# Patient Record
Sex: FEMALE | Race: WHITE | RURAL
[De-identification: names, ages, dates, MRNs, and addresses within clinical notes are randomized per-mention and may not be internally consistent; named-entity substitution may affect disease eponyms.]

---

## 2022-08-16 ENCOUNTER — OFFICE VISIT (OUTPATIENT)
Dept: INTERNAL MEDICINE CLINIC | Age: 32
End: 2022-08-16

## 2022-08-16 VITALS
BODY MASS INDEX: 33.97 KG/M2 | RESPIRATION RATE: 20 BRPM | HEART RATE: 83 BPM | OXYGEN SATURATION: 97 % | WEIGHT: 199 LBS | HEIGHT: 64 IN | TEMPERATURE: 98.4 F | DIASTOLIC BLOOD PRESSURE: 84 MMHG | SYSTOLIC BLOOD PRESSURE: 130 MMHG

## 2022-08-16 DIAGNOSIS — K21.00 GASTROESOPHAGEAL REFLUX DISEASE WITH ESOPHAGITIS WITHOUT HEMORRHAGE: ICD-10-CM

## 2022-08-16 DIAGNOSIS — Z76.89 ENCOUNTER TO ESTABLISH CARE WITH NEW DOCTOR: Primary | ICD-10-CM

## 2022-08-16 DIAGNOSIS — Z11.59 NEED FOR HEPATITIS C SCREENING TEST: ICD-10-CM

## 2022-08-16 PROBLEM — F43.10 PTSD (POST-TRAUMATIC STRESS DISORDER): Status: ACTIVE | Noted: 2022-08-16

## 2022-08-16 PROBLEM — F10.10 ETOH ABUSE: Status: ACTIVE | Noted: 2022-08-16

## 2022-08-16 PROBLEM — F50.2 BULIMIA NERVOSA IN REMISSION: Status: ACTIVE | Noted: 2022-08-16

## 2022-08-16 PROBLEM — F90.9 ADHD: Status: ACTIVE | Noted: 2022-08-16

## 2022-08-16 PROBLEM — F17.200 SMOKER: Status: ACTIVE | Noted: 2022-08-16

## 2022-08-16 PROBLEM — G47.09 OTHER INSOMNIA: Status: ACTIVE | Noted: 2022-08-16

## 2022-08-16 PROBLEM — F31.9 BIPOLAR AFFECTIVE (HCC): Status: ACTIVE | Noted: 2022-08-16

## 2022-08-16 PROBLEM — F12.90 MARIJUANA USE: Status: ACTIVE | Noted: 2022-08-16

## 2022-08-16 PROBLEM — F41.1 GAD (GENERALIZED ANXIETY DISORDER): Status: ACTIVE | Noted: 2022-08-16

## 2022-08-16 PROCEDURE — 99204 OFFICE O/P NEW MOD 45 MIN: CPT | Performed by: NURSE PRACTITIONER

## 2022-08-16 RX ORDER — FAMOTIDINE 20 MG/1
20 TABLET, FILM COATED ORAL 2 TIMES DAILY
COMMUNITY
End: 2022-08-16 | Stop reason: ALTCHOICE

## 2022-08-16 RX ORDER — BUSPIRONE HYDROCHLORIDE 10 MG/1
10 TABLET ORAL 3 TIMES DAILY
COMMUNITY

## 2022-08-16 RX ORDER — LAMOTRIGINE 25 MG/1
25 TABLET ORAL DAILY
COMMUNITY

## 2022-08-16 RX ORDER — CALC/MAG/B COMPLEX/D3/HERB 61
TABLET ORAL
Qty: 60 CAPSULE | Refills: 0 | Status: SHIPPED | OUTPATIENT
Start: 2022-08-16 | End: 2022-09-12

## 2022-08-16 RX ORDER — TEMAZEPAM 15 MG/1
15 CAPSULE ORAL
COMMUNITY

## 2022-08-16 NOTE — PATIENT INSTRUCTIONS
It was a pleasure taking care of you. Please have labs drawn and will review in 1-2 months. Return to clinic sooner for any new or worsening symptoms.     Adrien Martin NP

## 2022-08-16 NOTE — PROGRESS NOTES
Odalis Mane (: 1990) is a 33 y.o. female is here for evaluation of the following chief complaint(s): Establish Care, Nausea (Vomiting every morning), and Finger Pain  Pt presents to the clinic to establish care. Subjective/Objective:   Troy Sinha was seen today for Establish Care, Nausea (Vomiting every morning), and Finger Pain  Pt presents to the clinic to establish care and for vomiting every am. Pepcid used to relief the nausea. Pt denies any blood in the vomitus. PT denies fever or diarrhea or constipation. Pmhx of YOUNG ADHD  smoker ETOH abuse uses medical marijuana daily GERD PTSD Bipolar. Pt recently seen by her psychiatrist who manages her YOUNG bipolar and PTSD. Pt reports hx of Bulemia but reports has not vomited purposefully in 3 years. Pt reports EGD and had esophagitis. Last saw GI 5 months ago. Trying to get records. Review of Systems   Constitutional: Negative. HENT: Negative. Eyes: Negative. Respiratory: Negative. Cardiovascular: Negative. Gastrointestinal:  Positive for nausea and vomiting. Skin: Negative. Neurological: Negative. Endo/Heme/Allergies: Negative. Psychiatric/Behavioral:  Positive for depression and substance abuse. Negative for hallucinations, memory loss and suicidal ideas. The patient is nervous/anxious. The patient does not have insomnia. Physical Exam  Vitals reviewed. Constitutional:       General: She is not in acute distress. Appearance: Normal appearance. She is well-groomed. She is obese. She is not ill-appearing. HENT:      Head: Normocephalic and atraumatic. Right Ear: Tympanic membrane, ear canal and external ear normal.      Left Ear: Tympanic membrane, ear canal and external ear normal.      Ears:      Comments: Denies difficulty hearing      Nose: Nose normal.      Mouth/Throat:      Lips: Pink. Mouth: Mucous membranes are moist.      Pharynx: Oropharynx is clear. Uvula midline.       Comments: PT denies any acute dental pain. Eyes:      General: Lids are normal.      Extraocular Movements: Extraocular movements intact. Conjunctiva/sclera: Conjunctivae normal.      Comments: Denies any acute vision changes. Neck:      Thyroid: No thyroid mass, thyromegaly or thyroid tenderness. Vascular: No carotid bruit or JVD. Trachea: Trachea and phonation normal.   Cardiovascular:      Rate and Rhythm: Normal rate and regular rhythm. Pulses: Normal pulses. Radial pulses are 2+ on the right side and 2+ on the left side. Heart sounds: Normal heart sounds, S1 normal and S2 normal.   Pulmonary:      Effort: Pulmonary effort is normal.      Breath sounds: Normal breath sounds and air entry. Abdominal:      General: Abdomen is flat. Bowel sounds are normal.      Palpations: Abdomen is soft. There is no hepatomegaly or splenomegaly. Tenderness: There is no abdominal tenderness. There is no right CVA tenderness or left CVA tenderness. Hernia: No hernia is present. Musculoskeletal:         General: Normal range of motion. Cervical back: Full passive range of motion without pain and normal range of motion. No spinous process tenderness or muscular tenderness. Right lower leg: No edema. Left lower leg: No edema. Lymphadenopathy:      Cervical: No cervical adenopathy. Skin:     General: Skin is warm and dry. Capillary Refill: Capillary refill takes less than 2 seconds. Neurological:      General: No focal deficit present. Mental Status: She is alert and oriented to person, place, and time. Psychiatric:         Attention and Perception: Attention and perception normal.         Mood and Affect: Mood is anxious. Speech: Speech is rapid and pressured. Behavior: Behavior is hyperactive. Behavior is not agitated. Behavior is cooperative. Thought Content:  Thought content normal.         Cognition and Memory: Cognition and memory normal. Judgment: Judgment normal.        /84 (BP 1 Location: Left arm, BP Patient Position: Sitting, BP Cuff Size: Adult)   Pulse 83   Temp 98.4 °F (36.9 °C) (Temporal)   Resp 20   Ht 5' 4\" (1.626 m)   Wt 199 lb (90.3 kg)   LMP 08/08/2022 (Approximate)   SpO2 97%   BMI 34.16 kg/m²      No results found for any previous visit. Past Surgical History:   Procedure Laterality Date    HX APPENDECTOMY          No past medical history on file. Current Outpatient Medications   Medication Instructions    busPIRone (BUSPAR) 10 mg, Oral, 3 TIMES DAILY    cariprazine (VRAYLAR) 3 mg, Oral, DAILY    lamoTRIgine (LAMICTAL) 25 mg, Oral, DAILY    lansoprazole (PREVACID) 15 mg capsule Take one capsule every am    Lisdexamfetamine (VYVANSE) 70 mg, Oral, DAILY    temazepam (RESTORIL) 15 mg, Oral, BEDTIME PRN        Assessment/Plan:     The above diagnosis is a chronic problem. We discussed expected course, resolution, and complications of diagnosis in detail. I advised her to call back or return to office if symptoms worsen/change/persist.        ICD-10-CM ICD-9-CM    1. Encounter to establish care with new doctor  Z76.89 V65.8       2. Gastroesophageal reflux disease with esophagitis without hemorrhage  K21.00 530.81      530.10       3. BMI 34.0-34.9,adult  Z68.34 V85.34 LIPID PANEL      METABOLIC PANEL, COMPREHENSIVE      TSH 3RD GENERATION      CBC WITH AUTOMATED DIFF      URINALYSIS W/ RFLX MICROSCOPIC      4. Need for hepatitis C screening test  Z11.59 V73.89 HEPATITIS C AB, RFLX TO QT BY PCR         Return in about 4 weeks (around 9/13/2022). An electronic signature was used to authenticate this note.   -- Aiyana Millard, SO

## 2022-08-16 NOTE — PROGRESS NOTES
Chief Complaint   Patient presents with    Establish Care    Nausea     Vomiting every morning    Finger Pain

## 2022-09-12 RX ORDER — CALC/MAG/B COMPLEX/D3/HERB 61
TABLET ORAL
Qty: 30 CAPSULE | Refills: 1 | Status: SHIPPED | OUTPATIENT
Start: 2022-09-12 | End: 2022-10-05

## 2022-10-05 RX ORDER — CALC/MAG/B COMPLEX/D3/HERB 61
TABLET ORAL
Qty: 30 CAPSULE | Refills: 1 | Status: SHIPPED | OUTPATIENT
Start: 2022-10-05 | End: 2022-11-01

## 2022-11-01 RX ORDER — CALC/MAG/B COMPLEX/D3/HERB 61
TABLET ORAL
Qty: 30 CAPSULE | Refills: 1 | Status: SHIPPED | OUTPATIENT
Start: 2022-11-01